# Patient Record
(demographics unavailable — no encounter records)

---

## 2025-03-11 NOTE — REVIEW OF SYSTEMS
[Post Nasal Drip] : post nasal drip [Ear Pain] : ear pain [Hearing Loss] : hearing loss [As Noted in HPI] : as noted in HPI [Nasal Congestion] : nasal congestion [Negative] : Heme/Lymph

## 2025-03-11 NOTE — END OF VISIT
[FreeTextEntry3] : I, Dr. Tiwari personally performed the evaluation and management (E/M) services , including all procedures, for this established patient who presents today with (a) new problem(s)/exacerbation of (an) existing condition(s). That E/M includes conducting the clinically appropriate interval history &/or exam, assessing all new/exacerbated conditions, and establishing a new plan of care. Today, my ANGELLA, Karla Ruelas, was here to observe &/or participate in the visit & follow plan of care established by me.

## 2025-03-11 NOTE — PHYSICAL EXAM
[Nasal Endoscopy Performed] : nasal endoscopy was performed, see procedure section for findings [] : septum deviated to the right [Midline] : trachea located in midline position [Removed] : palatine tonsils previously removed [Normal] : tympanic membranes are normal in both ears

## 2025-03-11 NOTE — ASSESSMENT
[FreeTextEntry1] : Patient follows up feeling much better feels that her ear is less clogged on examination still appears to be some fluid audiogram confirmed minimal conductive hearing loss heading in the right direction follow-up and see us in 1 month she will continue using the Flonase nasal spray and use 1% hydrocortisone for itchiness that she experiences on occasion in her ears.

## 2025-03-11 NOTE — HISTORY OF PRESENT ILLNESS
[de-identified] : Patient has had a few weeks of nasal congestion with a mild cough and ear clogging. She went to urgent care and was told that her ear clogging was due to an ear infection and she was given amoxicillin. She has frequent sinus infections a few times a year and gets treated with antibiotics. She reports blowing out yellow green mucus and feelings like she cannot breathe through the nose. She does feel that her hearing is muffled since this started. No ringing in the ears. No fevers or chills  SHe has been using Flonase  [FreeTextEntry1] : INTERVAL  Patient comes in with continued noises in the ear, crackling, but improved. She still feels slightly muffled but she notes it got better after the steroids. She does not hagve any nasal congestion right now, she continues to use the flonsae